# Patient Record
Sex: MALE | Race: WHITE | NOT HISPANIC OR LATINO | Employment: FULL TIME | URBAN - METROPOLITAN AREA
[De-identification: names, ages, dates, MRNs, and addresses within clinical notes are randomized per-mention and may not be internally consistent; named-entity substitution may affect disease eponyms.]

---
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
## 2018-07-30 ENCOUNTER — OFFICE VISIT (OUTPATIENT)
Dept: URGENT CARE | Age: 18
End: 2018-07-30
Payer: COMMERCIAL

## 2018-07-30 VITALS
SYSTOLIC BLOOD PRESSURE: 124 MMHG | WEIGHT: 182.2 LBS | DIASTOLIC BLOOD PRESSURE: 76 MMHG | TEMPERATURE: 98.1 F | RESPIRATION RATE: 16 BRPM | OXYGEN SATURATION: 98 % | HEART RATE: 92 BPM

## 2018-07-30 DIAGNOSIS — R30.0 DYSURIA: Primary | ICD-10-CM

## 2018-07-30 DIAGNOSIS — R30.0 BURNING WITH URINATION: Primary | ICD-10-CM

## 2018-07-30 LAB
SL AMB  POCT GLUCOSE, UA: NORMAL
SL AMB LEUKOCYTE ESTERASE,UA: NORMAL
SL AMB POCT BILIRUBIN,UA: NORMAL
SL AMB POCT BLOOD,UA: NORMAL
SL AMB POCT CLARITY,UA: CLEAR
SL AMB POCT COLOR,UA: YELLOW
SL AMB POCT KETONES,UA: NORMAL
SL AMB POCT NITRITE,UA: NORMAL
SL AMB POCT PH,UA: 5
SL AMB POCT SPECIFIC GRAVITY,UA: 1.01
SL AMB POCT URINE PROTEIN: NORMAL
SL AMB POCT UROBILINOGEN: 0.2

## 2018-07-30 PROCEDURE — 81002 URINALYSIS NONAUTO W/O SCOPE: CPT | Performed by: FAMILY MEDICINE

## 2018-07-30 PROCEDURE — 99203 OFFICE O/P NEW LOW 30 MIN: CPT | Performed by: FAMILY MEDICINE

## 2018-07-30 PROCEDURE — 87086 URINE CULTURE/COLONY COUNT: CPT | Performed by: FAMILY MEDICINE

## 2018-07-30 NOTE — PROGRESS NOTES
[facility]        NAME: Tina Cerna is a 19 y o  male  : 2000    MRN: [id number]  DATE: 2018  TIME: 6:50 PM    Assessment and Plan   Burning with urination [R30 0]  1  Burning with urination  POCT urine dip     UA dip unremarkable  Patient Instructions   Your urine dip was normal    We will send your urine for a culture  Please call us in 2-3 days for the results  Increase fluid intake to at least 68 oz water per day  If your urine culture is normal, and the pain continues, you will need further evaluation by a urologist  Madhavi Mccord can find a urologist through our [employer] number  [phone]  If symptoms worsen, or you notice any fevers, chills, vomiting or discharge, go to the ER immediately  Chief Complaint     Chief Complaint   Patient presents with    Back Pain     Pt has pain since this morning, burning when urinating         History of Present Illness       20 y/o male with c/o urinary burning and pain since last night  He denies penile discharge and lesions  He denies new sexual partners  He denies recent intercourse  He also c/o lower back pain on right side  He denies n/v/d  Denies constipation, fevers and chills  Of note, he only drinks 17 oz water per day  Review of Systems   Review of Systems   Constitutional: Negative  HENT: Negative  Respiratory: Negative  Gastrointestinal: Negative for nausea and vomiting  Genitourinary: Positive for dysuria  Negative for decreased urine volume, discharge, flank pain, genital sores, hematuria, penile pain, penile swelling and urgency  All other systems reviewed and are negative  Current Medications     No current outpatient prescriptions on file      Current Allergies     Allergies as of 2018    (No Known Allergies)            The following portions of the patient's history were reviewed and updated as appropriate: allergies, current medications, past family history, past medical history, past social history, past surgical history and problem list    No past medical history on file  No past surgical history on file  Objective   /76   Pulse 92   Temp 98 1 °F (36 7 °C)   Resp 16   Wt 82 6 kg (182 lb 3 2 oz)   SpO2 98%        Physical Exam     Physical Exam   Constitutional: He is oriented to person, place, and time  He appears well-developed and well-nourished  Cardiovascular: Normal rate, regular rhythm and normal heart sounds  Pulmonary/Chest: Effort normal and breath sounds normal    Abdominal: Soft  Normal appearance and bowel sounds are normal  There is no tenderness  There is no rigidity, no rebound, no guarding and no CVA tenderness  Neurological: He is alert and oriented to person, place, and time  Psychiatric: He has a normal mood and affect  His behavior is normal    Nursing note and vitals reviewed

## 2018-07-30 NOTE — PATIENT INSTRUCTIONS
Your urine dip was normal    We will send your urine for a culture  Please call us in 2-3 days for the results  Increase fluid intake to at least 68 oz water per day  If your urine culture is normal, and the pain continues, you will need further evaluation by a urologist  Michi Garvey can find a urologist through our [employer] number  [phone]  If symptoms worsen, or you notice any fevers, chills, vomiting or discharge, go to the ER immediately

## 2018-07-31 LAB — BACTERIA UR CULT: ABNORMAL

## 2018-08-02 ENCOUNTER — TELEPHONE (OUTPATIENT)
Dept: URGENT CARE | Facility: MEDICAL CENTER | Age: 18
End: 2018-08-02

## 2018-08-02 NOTE — TELEPHONE ENCOUNTER
Discussed culture results with patient  Referred to urology  ----- Message from Gely Soliman DO sent at 8/1/2018  4:32 PM EDT -----      ----- Message -----  From: Lab, Background User  Sent: 7/31/2018   7:28 PM  To:  Gely Soliman DO

## 2018-12-05 ENCOUNTER — HOSPITAL ENCOUNTER (OUTPATIENT)
Dept: SLEEP MEDICINE | Facility: HOSPITAL | Age: 18
Discharge: HOME | End: 2018-12-05
Attending: INTERNAL MEDICINE | Admitting: INTERNAL MEDICINE

## 2018-12-28 ENCOUNTER — HOSPITAL ENCOUNTER (OUTPATIENT)
Dept: SLEEP MEDICINE | Facility: HOSPITAL | Age: 18
Discharge: HOME | End: 2018-12-28
Attending: INTERNAL MEDICINE | Admitting: INTERNAL MEDICINE

## 2019-03-14 ENCOUNTER — HOSPITAL ENCOUNTER (OUTPATIENT)
Dept: SLEEP MEDICINE | Facility: HOSPITAL | Age: 19
Discharge: HOME | End: 2019-03-14
Attending: INTERNAL MEDICINE | Admitting: INTERNAL MEDICINE

## 2019-06-18 PROBLEM — G47.33 OSA (OBSTRUCTIVE SLEEP APNEA): Status: ACTIVE | Noted: 2019-06-18

## 2019-06-20 ENCOUNTER — OFFICE VISIT (OUTPATIENT)
Dept: SLEEP MEDICINE | Facility: HOSPITAL | Age: 19
End: 2019-06-20

## 2019-06-20 VITALS
DIASTOLIC BLOOD PRESSURE: 71 MMHG | OXYGEN SATURATION: 99 % | WEIGHT: 240.1 LBS | BODY MASS INDEX: 33.61 KG/M2 | SYSTOLIC BLOOD PRESSURE: 116 MMHG | HEIGHT: 71 IN | HEART RATE: 72 BPM

## 2019-06-20 DIAGNOSIS — G47.33 OSA (OBSTRUCTIVE SLEEP APNEA): Primary | ICD-10-CM

## 2019-06-20 PROCEDURE — G0463 HOSPITAL OUTPT CLINIC VISIT: HCPCS

## 2019-06-20 RX ORDER — LISINOPRIL 20 MG/1
20 TABLET ORAL DAILY
COMMUNITY
End: 2019-06-20

## 2019-06-20 NOTE — PROGRESS NOTES
SLEEP MEDICINE CONSULT      Patient Identification:     Name: Sj Lester   Age: 19 y.o.   Gender: male   : 2000   MRN: [id number]     Date of consult: 2019    PCP/Referring Physician(s):     PCP: Stacy Haney MD  Referring Provider: Paola Sharma MD      Chief Complaint:   Follow-up for compliance after using the mask with given pressures for 3 months or so.    History of Present Illness:   This is a very pleasant 19 years old  gentleman who has been diagnosed with obstructive sleep apnea.  He was started on a CPAP mode of therapy.  He is here today for 3 months follow-up for compliance.    Memory card has been downloaded.    He uses a nasal mask which is where nasal mask.  He has not noticed any air leaks around it.  The pressures are quite tolerable to him.  He uses humidifier on a regular basis.    He has slept much better with the given pressures in the mask.  He has often woken up with a nasal congestion in the morning.  He denies any postnasal drainage at night or in the morning.  Occasionally has dry mouth in the morning.  Denies symptoms of heartburn or headache at night or in the morning.  He does not snore with the mask in place.    He usually wakes up in the morning awake alert and rested.  He likes to drink 2 or 3 cups of coffee throughout the day.  He gets tired in the afternoon.  He does not take any naps.  He has sometimes dozed off after dinner.  He has started school this summer.  He is now working part-time with his school.    Allergies, Medications, and Past History:   Allergies:    No Known Allergies  Current Medications:    Prior to Admission medications    Medication Sig Start Date End Date Taking? Authorizing Provider   lisinopril (PRINIVIL,ZESTRIL) 20 MG tablet Take 20 mg by mouth Daily.  19 Yes Provider, MD Dona     Past Medical History:    Past Medical History:   Diagnosis Date   • Allergic rhinitis    • Chronic sinusitis    • Obesity   "  • S/P correction of deviated nasal septum       Past Surgical History:    Past Surgical History:   Procedure Laterality Date   • NASAL SEPTUM SURGERY     • SINUS SURGERY        Social History:    Social History     Tobacco Use   • Smoking status: Former Smoker   • Smokeless tobacco: Never Used   Substance Use Topics   • Alcohol use: Yes     Frequency: Monthly or less   • Drug use: No     Family Medical History:  Family History   Problem Relation Age of Onset   • Sleep apnea Father          Review of Systems:   Constitutional:  Denies fever or chills and weight gain.  Trying to lose weight.  Has successfully lost 10 pounds.  Eyes:  Denies change in visual acuity   HENT:  has nasal congestion, no sore throat or post nasal drainage he underwent allergy testing.  He was found to be severely allergic to pollens.  He takes Zyrtec over-the-counter for a while for a month or so.  He was also prescribed steroid nasal spray.  He felt anxious with nasal spray and antihistamine together.  Allergy shots were offered.  He declined it.  He  has started doing nasal rinses in the morning with distilled water.  Respiratory:  Denies cough, shortness of breath .  He has experienced dyspnea on exertion    Cardiovascular:  Denies chest pain or edema   GI:  Denies abdominal pain, nausea, vomiting, bloody stools or diarrhea   :  Denies dysuria or nocturia   Musculoskeletal:  Denies back pain or joint pain   Integument:  Denies rash   Neurologic:  Denies headache, focal weakness or sensory changes. No history of stroke or seizures.   Endocrine:  Denies polyuria or polydipsia   Lymphatic:  Denies swollen glands   Psychiatric:  Denies depression, anxiety or claustrophobia      Physical Exam:     Vitals:    06/20/19 1423   BP: 116/71   Pulse: 72   SpO2: 99%   Weight: 109 kg (240 lb 1.6 oz)   Height: 179.8 cm (70.8\")     HEENT: Head is normocephalic, atraumatic, normal distribution of hair. Pupils reactive to light. Ocular movements intact. " Moderate  nasal congestion on sniff test. No deviated nasal septum. No micrognathia.  Throat: Mallapatti stage 4, tongue midline, mucosa moist.  Neck: no JVD, thyromegaly, mass, +midline trachea, Neck circumference  20 inches  Lungs: clear, no wheeze, rhonchi, crackles  Cardiovascular: S1, S2, RRR no murmurs, rubs or gallops  Abd: +BS's soft NT/ND, no CVA tenderness.    Ext: no edema, cyanosis, clubbing, pulses intact  Neuro: Awake alert, oriented to time place and person. Grossly intact to motor, sensory cerebral, and cerebellar (without focal deficit)  Psych: No overt ren, depression, psychosis or inappropriate behavior  Skin: No rash, cellulitis, or ulcerations.  No facial rash or erosions    DIAGNOSTIC DATA  Memory card download shows data from 3/14/2019 to 6/19/2019 over 90 days of data reviewed 1 day without use percentage of use 99%.  Max hour she was 9 hours 1 minute.  Minimum time use 1 hour 37 minutes.  92.9% of the time the mask has been used for more than 4 hours.  The average apnea hypotony index is 6.2 events per hour at 9 cm of CPAP pressure.  Assessment/Plan:   Obstructive sleep apnea:  This is a very pleasant 19 years old  gentleman who has been diagnosed with sleep apnea.  He is here today for 3 months follow-up for compliance.  He is compliant with therapy.  He is getting benefit from it.    The results of memory card download were discussed in detail with the patient all questions were answered.  Recommendation.  He is advised to continue using his mask with given pressures for nightly basis.  Is advised to use the mask with given pressures for at least 4 hours of minimal benefit or as long as he sleeps on maximum benefit.  Is advised to use the mask with given pressures if he takes a nap during the daytime.  The CPAP pressure has been increased to 11 CWP.    Allergic rhinitis:  He has remained symptomatic with upper airway congestion which has interfered with adequate therapy for  sleep apnea.  He underwent allergy testing which showed severe reaction to environmental antigens.  He has declined immunization.  He has difficulty using a steroid nasal spray and entire histamine together.  He gets anxious with it.  Recommendation.  Continue antihistamine.  He may benefit from Singulair.  He is strongly advised to start immunization regimen.  Obesity; based on BMI of 33.7.  He is trying to lose weight.  He has restricted his calories intake.  He has successfully lost 10 pounds in last 3 months.  Recommendation.  He is advised to continue his weight loss regimen.  Driving instructions. Patient is advised to avoid driving if tired or somnolent. To avoid all alcoholic beverages or medications causing somnolence.         Diagnosis Plan   1. MEGAN (obstructive sleep apnea)       No orders of the defined types were placed in this encounter.    No orders of the defined types were placed in this encounter.        Paola Sharma MD  6/20/2019  3:50 PM

## 2019-12-30 ENCOUNTER — OFFICE VISIT (OUTPATIENT)
Dept: SLEEP MEDICINE | Facility: HOSPITAL | Age: 19
End: 2019-12-30

## 2019-12-30 VITALS
SYSTOLIC BLOOD PRESSURE: 141 MMHG | HEIGHT: 69 IN | DIASTOLIC BLOOD PRESSURE: 84 MMHG | WEIGHT: 241.6 LBS | OXYGEN SATURATION: 99 % | HEART RATE: 108 BPM | BODY MASS INDEX: 35.78 KG/M2

## 2019-12-30 DIAGNOSIS — G47.33 OBSTRUCTIVE SLEEP APNEA, ADULT: Primary | ICD-10-CM

## 2019-12-30 PROCEDURE — G0463 HOSPITAL OUTPT CLINIC VISIT: HCPCS

## 2019-12-30 NOTE — PROGRESS NOTES
SLEEP MEDICINE CONSULT      Patient Identification:     Name: Sj Lester   Age: 19 y.o.   Gender: male   : 2000   MRN: [id number]     Date of consult: 2019    PCP/Referring Physician(s):     PCP: Stacy Haney MD  Referring Provider: Paola Sharma MD      Chief Complaint:   Obstructive sleep apnea.  6 months follow-up for compliance.      History of Present Illness:   This is a very pleasant 19 years old  gentleman who has been diagnosed with obstructive sleep apnea.  He was started on a CPAP mode of therapy.  He is here today for 6months follow-up for compliance.    Memory card has been downloaded.    He uses a DreamWear nasal mask .  No air leaks noted by him.  The pressures are quite tolerable to him.  He uses humidifier on a regular basis.    He has slept much better with the given pressures in the mask.  He has often woken up with a nasal congestion in the morning.  He denies any postnasal drainage at night or in the morning.  Occasionally has dry mouth in the morning.  Denies symptoms of heartburn or headache at night or in the morning.  He does not snore with the mask in place.    He usually wakes up in the morning awake alert and rested.  He likes to drink 2 or 3 cups of coffee throughout the day.  He gets tired in the afternoon.  He does not take any naps.  He has sometimes dozed off after dinner.  He has started school this summer.  He is now working part-time with his school.    Allergies, Medications, and Past History:   Allergies:    No Known Allergies  Current Medications:    Prior to Admission medications    Medication Sig Start Date End Date Taking? Authorizing Provider   lisinopril (PRINIVIL,ZESTRIL) 20 MG tablet Take 20 mg by mouth Daily.  19 Yes Provider, MD Dona     Past Medical History:    Past Medical History:   Diagnosis Date   • Allergic rhinitis    • Chronic sinusitis    • Obesity    • S/P correction of deviated nasal septum       Past  "Surgical History:    Past Surgical History:   Procedure Laterality Date   • NASAL SEPTUM SURGERY     • SINUS SURGERY        Social History:    Social History     Tobacco Use   • Smoking status: Former Smoker   • Smokeless tobacco: Never Used   Substance Use Topics   • Alcohol use: Yes     Frequency: Monthly or less   • Drug use: No     Family Medical History:  Family History   Problem Relation Age of Onset   • Sleep apnea Father          Review of Systems:   Constitutional:  Denies fever or chills and weight gain.  Trying to lose weight.  Has successfully lost 14 pounds in 6 months..  Eyes:  Denies change in visual acuity   HENT:  has nasal congestion, no sore throat or post nasal drainage he underwent allergy testing.  He was found to be severely allergic to pollens.  He takes Zyrtec over-the-counter for a while for a month or so.  He was also prescribed steroid nasal spray.  He felt anxious with nasal spray and antihistamine together.  Allergy shots were offered.  He declined it.  He  has started doing nasal rinses in the morning with distilled water.  Respiratory:  Denies cough, shortness of breath .  He has experienced dyspnea on exertion    Cardiovascular:  Denies chest pain or edema   GI:  Denies abdominal pain, nausea, vomiting, bloody stools or diarrhea   :  Denies dysuria or nocturia   Musculoskeletal:  Denies back pain or joint pain   Integument:  Denies rash   Neurologic:  Denies headache, focal weakness or sensory changes. No history of stroke or seizures.   Endocrine:  Denies polyuria or polydipsia   Lymphatic:  Denies swollen glands   Psychiatric:  Denies depression, anxiety or claustrophobia      Physical Exam:     Vitals:    12/30/19 1607   BP: 141/84   Pulse: 108   SpO2: 99%   Weight: 110 kg (241 lb 9.6 oz)   Height: 175.3 cm (69\")     HEENT: Head is normocephalic, atraumatic, normal distribution of hair. Pupils reactive to light. Ocular movements intact. Moderate  nasal congestion on sniff test. " No deviated nasal septum. No micrognathia.  Throat: Mallapatti stage 4, tongue midline, mucosa moist.  Neck: no JVD, thyromegaly, mass, +midline trachea, Neck circumference  20 inches  Lungs: clear, no wheeze, rhonchi, crackles  Cardiovascular: S1, S2, RRR no murmurs, rubs or gallops  Abd: +BS's soft NT/ND, no CVA tenderness.    Ext: no edema, cyanosis, clubbing, pulses intact  Neuro: Awake alert, oriented to time place and person. Grossly intact to motor, sensory cerebral, and cerebellar (without focal deficit)  Psych: No overt ren, depression, psychosis or inappropriate behavior  Skin: No rash, cellulitis, or ulcerations.  No facial rash or erosions    DIAGNOSTIC DATA  Memory card download shows data from 6/19/2019 to 12/29/2019 [id number] days of data reviewed 8 day without use percentage of use 95.9%.  Max hour she was 9 hours 39 minute.  Minimum time use 6 hour 1 minutes.  91.8% of the time the mask has been used for more than 4 hours.  The average apnea hypotony index is 5.1events per hour at 11 cm of CPAP pressure.  Assessment/Plan:   Obstructive sleep apnea:  This is a very pleasant 19 years old  gentleman who has been diagnosed with sleep apnea.  He is here today for 6 months follow-up for compliance.  He is compliant with therapy.  He is getting benefit from it.    The results of memory card download were discussed in detail with the patient all questions were answered.  Recommendation.  He is advised to continue using his mask with given pressures for nightly basis.  Is advised to use the mask with given pressures for at least 4 hours of minimal benefit or as long as he sleeps on maximum benefit.  Is advised to use the mask with given pressures if he takes a nap during the daytime.      Allergic rhinitis:  He has remained symptomatic with upper airway congestion which has interfered with adequate therapy for sleep apnea.  He underwent allergy testing which showed severe reaction to environmental  antigens.  He has declined immunization.  He has difficulty using a steroid nasal spray and entire histamine together.  He gets anxious with it.  Recommendation.  Continue antihistamine.  He may benefit from Singulair.  He is strongly advised to start immunization regimen.  Obesity; based on BMI of 35.7.  He is trying to lose weight.  He has restricted his calories intake.  He has successfully lost 10 pounds in last 3 months.  Recommendation.  He is advised to continue his weight loss regimen.  Driving instructions. Patient is advised to avoid driving if tired or somnolent. To avoid all alcoholic beverages or medications causing somnolence.         Diagnosis Plan   1. Obstructive sleep apnea, adult  CPAP Therapy     No orders of the defined types were placed in this encounter.    Orders Placed This Encounter   Procedures   • CPAP Therapy     Order Specific Question:   Equipment     Answer:    CPAP     Order Specific Question:   PAP Machine Brand     Answer:   Respironics     Order Specific Question:   PAP Tubing (1 per 3 months)     Answer:    6' Standard tubing     Order Specific Question:   PAP Mask (1 per 3 months)     Answer:    Nasal mask     Order Specific Question:   Nasal cushion or pillow (2 per month)     Answer:    Cushion replacement for nasal type     Order Specific Question:   PAP Accessories     Answer:    Water Chamber for PAP Humidifier (1 per 6 month) &  Filter PAP Disposable (2 per month) &  Filter PAP Non-Disposable (1 per 6 months) &  Chinstrap to be used with PAP (1 per 6 months) &  Headgear to be used with PAP (1 per 6 mo)     Order Specific Question:   Does the patient have Obstructive Sleep Apnea or other qualifying diagnosis for PAP ordered?     Answer:   Yes     Order Specific Question:   Does the patient require humidification?     Answer:   Yes     Order Specific Question:   Humidifier     Answer:    Humidifier Heated for CPAP or BiPAP      Order Specific Question:   If ordering a BiPAP for MEGAN a CPAP has been tried and/or ruled out?     Answer:   Does not apply     Order Specific Question:   The patient requires a PAP Device & continued use of Supplies to administer effective PAP therapy at the frequency of use ordered above     Answer:   Yes     Order Specific Question:   Initial Supplies and refills authorized for 12 months?     Answer:   Yes     Order Specific Question:   The face to face evaluation was performed on     Answer:   12/30/2019     Order Specific Question:   Which [employer] company is this being sent to?     Answer:   [employer] [facility] [[id number]]     Order Specific Question:   Length of Need (99 Months = Lifetime)     Answer:   99 Months = Lifetime         Paola Sharma MD  12/30/2019  5:05 PM

## 2021-03-02 ENCOUNTER — OFFICE VISIT (OUTPATIENT)
Dept: SLEEP MEDICINE | Facility: CLINIC | Age: 21
End: 2021-03-02

## 2021-03-02 VITALS
WEIGHT: 250 LBS | OXYGEN SATURATION: 98 % | DIASTOLIC BLOOD PRESSURE: 76 MMHG | BODY MASS INDEX: 37.03 KG/M2 | SYSTOLIC BLOOD PRESSURE: 117 MMHG | HEART RATE: 82 BPM | HEIGHT: 69 IN

## 2021-03-02 DIAGNOSIS — E66.9 CLASS 2 OBESITY: ICD-10-CM

## 2021-03-02 DIAGNOSIS — G47.33 OSA ON CPAP: Primary | ICD-10-CM

## 2021-03-02 DIAGNOSIS — Z99.89 OSA ON CPAP: Primary | ICD-10-CM

## 2021-03-02 PROBLEM — E66.812 CLASS 2 OBESITY: Status: ACTIVE | Noted: 2021-03-02

## 2021-03-02 PROCEDURE — G0463 HOSPITAL OUTPT CLINIC VISIT: HCPCS

## 2021-03-02 PROCEDURE — 99213 OFFICE O/P EST LOW 20 MIN: CPT | Performed by: INTERNAL MEDICINE

## 2021-03-02 RX ORDER — CETIRIZINE HYDROCHLORIDE 5 MG/1
5 TABLET ORAL DAILY
COMMUNITY

## 2021-03-02 NOTE — PROGRESS NOTES
[facility]  [address] [place] [address]  Phone   Fax       SLEEP CLINIC FOLLOW UP PROGRESS NOTE.    Sj Lester  2000  20 y.o.  male      PCP: Stacy Haney MD      Date of visit: 3/2/2021    Chief Complaint   Patient presents with   • Sleep Apnea   • Follow-up       INTERM HISTORY:  This is a 20 y.o. years old patient who has a history of sleep apnea and is on CPAP.   The patient was previously seen on December 30, 2019, the note was reviewed.  Was a patient of Dr Paola Sharma MD  The diagnostic study done, split-night study on December 28, 2018 which showed severe sleep apnea with AHI of 35/h, was also reviewed with the patient.  Patient states that he feels much better and has more energy when he wakes up.  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    Sleep schedule  Normally goes to bed at 1 AM  Wakes up at 7:15 AM  Number of times you wake up once asleep: None  Do you feel refreshed after waking up ?  Yes    The Smart card downloaded on 3/2/2021 has been reviewed by me for compliance and discussed the data with the patient  Compliance; 93%  > 4 hr use, 91%  Average use of the device 6 hours and 24 minutes per night  Residual AHI: 2.9 /hr (normal less than 5)  Mask type: Nasal, but he would like to try nasal pillows DreamWear  DME: [facility]      Past Medical History:   Diagnosis Date   • Allergic rhinitis    • Chronic sinusitis    • Obesity    • MEGAN on CPAP     AHI 35/h   • S/P correction of deviated nasal septum        MEDICATIONS: reviewed by me    Current Outpatient Medications:   •  cetirizine (zyrTEC) 5 MG tablet, Take 5 mg by mouth Daily., Disp: , Rfl:     No Known Allergies reviewed by me    SOCIAL, FAMILY HISTORY: Medical records are reviewed and noted by me.  History of smoking no  History of alcohol use less than 2 a week  Caffeine use 3    REVIEW OF SYSTEMS:   [place] Sleepiness  "Scale :Total score: 3   Snoring: Resolved  Morning headache: No  Nasal congestion: Yes  Leg movements: No  Heart burn no    PHYSICAL EXAMINATION:  Vitals:    03/02/21 1006   BP: 117/76   BP Location: Right arm   Patient Position: Sitting   Cuff Size: Adult   Pulse: 82   SpO2: 98%   Weight: 113 kg (250 lb)   Height: 175.3 cm (69\")    Body mass index is 36.92 kg/m².    HEENT: pupils are round equal and reacting to light and accommodation, nasal passage is clear, no nasal polyps, no lymphadenopathy, throat is clear, oral airway Mallampati class 3  RESPRATORY SYSTEM: Breath sounds are equal on both sides and are normal, no wheezes or crackles  CARDIOVASULAR SYSTEM: Heart rate is regular without murmur  ABDOMEN: Soft, no ascites, no hepatosplenomegaly.  EXTREMITIES: No cyanosis, clubbing or edema       ASSESSMENT AND PLAN:  · Obstructive sleep apnea, patient is using the device with good compliance for treatment of sleep apnea.  I have reviewed the smart card down load and discussed with the patient the download data and encouarged the patient to continue to use the device.  The symptoms have improved and the residual AHI is acceptable.  The device is benefiting the patient and the device is medically necessary. The patient is also instructed to get the supplies from the [facility] and a prescription for supplies has been sent to the [facility].  I have also discussed the good sleep hygiene habits and adequate amount of sleep needed.  · Obesity, patient's BMI is Body mass index is 36.92 kg/m²..  I have discussed weight reduction and the health benefits.  I have also discuss the relationship between the weight and sleep apnea and encouraged the patient to lose weight which is beneficial in treating sleep apnea. Discussed diet and exercise with the patient.  · Nasal congestion.  I have talked to the patient about adjusting the humidification.  · Return to clinic in 12 months for follow-up and patient also instructed to " call the sleep clinic for any problems.  All the patient's questions were answered.        Angela Pretty MD  Sleep Medicine  Medical Director for [facility]  3/2/2021

## 2021-07-21 ENCOUNTER — TELEPHONE (OUTPATIENT)
Dept: SLEEP MEDICINE | Facility: CLINIC | Age: 21
End: 2021-07-21

## 2022-03-01 ENCOUNTER — OFFICE VISIT (OUTPATIENT)
Dept: SLEEP MEDICINE | Facility: CLINIC | Age: 22
End: 2022-03-01

## 2022-03-01 VITALS
BODY MASS INDEX: 37.03 KG/M2 | SYSTOLIC BLOOD PRESSURE: 127 MMHG | DIASTOLIC BLOOD PRESSURE: 77 MMHG | HEIGHT: 69 IN | HEART RATE: 83 BPM | OXYGEN SATURATION: 98 % | WEIGHT: 250 LBS

## 2022-03-01 DIAGNOSIS — G47.33 OSA ON CPAP: Primary | ICD-10-CM

## 2022-03-01 DIAGNOSIS — E66.9 CLASS 2 OBESITY: ICD-10-CM

## 2022-03-01 DIAGNOSIS — Z99.89 OSA ON CPAP: Primary | ICD-10-CM

## 2022-03-01 PROCEDURE — G0463 HOSPITAL OUTPT CLINIC VISIT: HCPCS

## 2022-03-01 PROCEDURE — 99213 OFFICE O/P EST LOW 20 MIN: CPT | Performed by: INTERNAL MEDICINE

## 2022-03-01 NOTE — PROGRESS NOTES
"  [address] [address]  Phone   Fax       SLEEP CLINIC FOLLOW UP PROGRESS NOTE.    Sj Lester  2000  21 y.o.  male      PCP: Stacy Haney MD      Date of visit: 3/1/2022    Chief Complaint   Patient presents with   • Sleep Apnea   • Obesity       HPI:  This is a 21 y.o. years old patient is here for the management of obstructive sleep apnea.  Sleep apnea is severe in severity with a AHI of 35/hr. Patient is using positive airway pressure therapy with CPAP 9 cm and the symptoms of snoring, non-restorative sleep and daytime excessive sleepiness have improved significantly on the therapy. Normally goes to bed at 12 midnight and wakes up at 7:45 AM.  The patient wakes up 1 time(s) during the night and has no problem going back to sleep.  Feels refreshed after waking up.  Patient also denies headaches and nasal congestion.  He works as a banker    Medications and allergies are reviewed by me and documented in the encounter.     SOCIAL (habits pertaining to sleep medicine)  History tobacco use:No   History of alcohol use: 0 per week  Caffeine use: 2     REVIEW OF SYSTEMS:   [place] Sleepiness Scale :Total score: 4   Nasal congestion:No   Dry mouth/nose:No   Post nasal drip; No   Acid reflux/Heartburn:No   Abd bloating:No   Morning headache:No   Anxiety:No   Depression:No    PHYSICAL EXAMINATION:  CONSTITUTIONAL:  Vitals:    03/01/22 1604   BP: 127/77   BP Location: Left arm   Patient Position: Sitting   Cuff Size: Adult   Pulse: 83   SpO2: 98%   Weight: 113 kg (250 lb)   Height: 175.3 cm (69\")    Body mass index is 36.92 kg/m².   NOSE: nasal passages are clear, No deformities noted   RESP SYSTEM: Not in any respiratory distress, no chest deformities noted,   CARDIOVASULAR: No edema noted  NEURO: Oriented x 3, gait normal,  Mood and affect appeared appropriate      Data reviewed:  The Smart card downloaded on 3/1/2022 has been " reviewed independently by me for compliance and discussed the data with the patient.   Compliance; 100%  More than 4 hr use, 100%  Average use of the device 6 hours and 40 g per night  Residual AHI: 3.3 /hr (goal < 5.0 /hr)  Mask type: Nasal mask  Device: DreamStation 2  DME: [employer]      ASSESSMENT AND PLAN:  · Obstructive sleep apnea ( G 47.33).  The symptoms of sleep apnea have improved with the device and the treatment.  Patient's compliance with the device is excellent for treatment of sleep apnea.  I have independently reviewed the smart card down load and discussed with the patient the download data and encouarged the patient to continue to use the device.The residual AHI is acceptable. The device is benefiting the patient and the device is medically necessary.  Without proper control of sleep apnea and good compliance there is a increased risk for hypertension, diabetes mellitus and nonrestorative sleep with hypersomnia which can increase risk for motor vehicle accidents.  Untreated sleep apnea is also a risk factor for development of atrial fibrillation, pulmonary hypertension and stroke. The patient is also instructed to get the supplies from the [facility] and and change them on a regular basis.  A prescription for supplies has been sent to the [facility].  I have also discussed the good sleep hygiene habits and adequate amount of sleep needed for good health.  · Obesity  2 with BMI is Body mass index is 36.92 kg/m².. I have discuss the relationship between the weight and sleep apnea. The benefit of weight loss in reducing severity of sleep apnea was discussed. Discussed diet and exercise with the patient to achieve ideal BMI.   · Return in about 1 year (around 3/1/2023) for Annual visit with smartcard download. . Patient's questions were answered.      Angela Pretty MD  Sleep Medicine.  Medical Director, [facility]  3/1/2022 ,

## 2022-04-12 ENCOUNTER — OFFICE (AMBULATORY)
Dept: URBAN - METROPOLITAN AREA CLINIC 64 | Facility: CLINIC | Age: 22
End: 2022-04-12

## 2022-04-12 VITALS
HEIGHT: 69 IN | HEART RATE: 83 BPM | SYSTOLIC BLOOD PRESSURE: 121 MMHG | DIASTOLIC BLOOD PRESSURE: 79 MMHG | WEIGHT: 257 LBS

## 2022-04-12 DIAGNOSIS — R10.84 GENERALIZED ABDOMINAL PAIN: ICD-10-CM

## 2022-04-12 DIAGNOSIS — R11.0 NAUSEA: ICD-10-CM

## 2022-04-12 DIAGNOSIS — R19.7 DIARRHEA, UNSPECIFIED: ICD-10-CM

## 2022-04-12 PROCEDURE — 99204 OFFICE O/P NEW MOD 45 MIN: CPT | Performed by: INTERNAL MEDICINE

## 2022-04-12 RX ORDER — ONDANSETRON HYDROCHLORIDE 4 MG/1
TABLET, FILM COATED ORAL
Qty: 45 | Refills: 0 | Status: ACTIVE
Start: 2022-04-12

## 2022-04-12 RX ORDER — DICYCLOMINE HYDROCHLORIDE 20 MG/1
TABLET ORAL
Qty: 90 | Refills: 0 | Status: ACTIVE

## 2022-05-16 ENCOUNTER — OFFICE (AMBULATORY)
Dept: URBAN - METROPOLITAN AREA PATHOLOGY 4 | Facility: PATHOLOGY | Age: 22
End: 2022-05-16

## 2022-05-16 ENCOUNTER — ON CAMPUS - OUTPATIENT (AMBULATORY)
Dept: URBAN - METROPOLITAN AREA HOSPITAL 2 | Facility: HOSPITAL | Age: 22
End: 2022-05-16

## 2022-05-16 VITALS
DIASTOLIC BLOOD PRESSURE: 66 MMHG | HEART RATE: 98 BPM | HEART RATE: 91 BPM | HEART RATE: 85 BPM | DIASTOLIC BLOOD PRESSURE: 59 MMHG | HEART RATE: 97 BPM | SYSTOLIC BLOOD PRESSURE: 143 MMHG | HEART RATE: 93 BPM | OXYGEN SATURATION: 94 % | DIASTOLIC BLOOD PRESSURE: 71 MMHG | HEART RATE: 80 BPM | OXYGEN SATURATION: 98 % | OXYGEN SATURATION: 96 % | HEART RATE: 96 BPM | SYSTOLIC BLOOD PRESSURE: 121 MMHG | OXYGEN SATURATION: 100 % | DIASTOLIC BLOOD PRESSURE: 93 MMHG | TEMPERATURE: 98.2 F | SYSTOLIC BLOOD PRESSURE: 90 MMHG | WEIGHT: 249 LBS | HEART RATE: 87 BPM | OXYGEN SATURATION: 99 % | SYSTOLIC BLOOD PRESSURE: 110 MMHG | RESPIRATION RATE: 97 BRPM | DIASTOLIC BLOOD PRESSURE: 63 MMHG | DIASTOLIC BLOOD PRESSURE: 69 MMHG | DIASTOLIC BLOOD PRESSURE: 68 MMHG | HEIGHT: 69 IN | SYSTOLIC BLOOD PRESSURE: 133 MMHG | SYSTOLIC BLOOD PRESSURE: 119 MMHG | SYSTOLIC BLOOD PRESSURE: 136 MMHG | RESPIRATION RATE: 16 BRPM

## 2022-05-16 DIAGNOSIS — K31.89 OTHER DISEASES OF STOMACH AND DUODENUM: ICD-10-CM

## 2022-05-16 DIAGNOSIS — K20.80 OTHER ESOPHAGITIS WITHOUT BLEEDING: ICD-10-CM

## 2022-05-16 DIAGNOSIS — R11.2 NAUSEA WITH VOMITING, UNSPECIFIED: ICD-10-CM

## 2022-05-16 DIAGNOSIS — K44.9 DIAPHRAGMATIC HERNIA WITHOUT OBSTRUCTION OR GANGRENE: ICD-10-CM

## 2022-05-16 DIAGNOSIS — R10.84 GENERALIZED ABDOMINAL PAIN: ICD-10-CM

## 2022-05-16 DIAGNOSIS — R19.4 CHANGE IN BOWEL HABIT: ICD-10-CM

## 2022-05-16 DIAGNOSIS — R19.7 DIARRHEA, UNSPECIFIED: ICD-10-CM

## 2022-05-16 LAB
GI HISTOLOGY: A. UNSPECIFIED: (no result)
GI HISTOLOGY: B. SELECT: (no result)
GI HISTOLOGY: C. UNSPECIFIED: (no result)
GI HISTOLOGY: PDF REPORT: (no result)

## 2022-05-16 PROCEDURE — 88305 TISSUE EXAM BY PATHOLOGIST: CPT | Performed by: INTERNAL MEDICINE

## 2022-05-16 PROCEDURE — 45380 COLONOSCOPY AND BIOPSY: CPT | Performed by: INTERNAL MEDICINE

## 2022-05-16 PROCEDURE — 43239 EGD BIOPSY SINGLE/MULTIPLE: CPT | Performed by: INTERNAL MEDICINE

## 2022-05-16 RX ORDER — PANTOPRAZOLE SODIUM 40 MG/1
80 TABLET, DELAYED RELEASE ORAL
Qty: 180 | Refills: 3 | Status: COMPLETED
Start: 2022-05-16 | End: 2023-10-06 | Stop reason: SDUPTHER

## 2022-05-18 PROBLEM — K31.89 OTHER DISEASES OF STOMACH AND DUODENUM: Status: ACTIVE | Noted: 2022-05-16

## 2022-05-18 PROBLEM — K44.9 DIAPHRAGMATIC HERNIA WITHOUT OBSTRUCTION OR GANGRENE: Status: ACTIVE | Noted: 2022-05-16

## 2022-05-18 PROBLEM — K20.80 OTHER ESOPHAGITIS WITHOUT BLEEDING: Status: ACTIVE | Noted: 2022-05-16

## 2022-07-28 ENCOUNTER — TELEPHONE (OUTPATIENT)
Dept: FAMILY MEDICINE CLINIC | Facility: CLINIC | Age: 22
End: 2022-07-28

## 2022-07-28 NOTE — TELEPHONE ENCOUNTER
Caller: BRISEIDA MELÉNDEZ    Relationship to patient: Mother    Best call back number: [phone]      Date of exposure: UNKOWN    Date of positive COVID19 test: 07/28/22        COVID19 symptoms: FEVER, SHAKY AND NOT FEELING WELL AND PULSE IS ELEVATED.        Additional information or concerns: THEY ARE WANTING TO KNOW WHAT THEY NEED TO DO FOR HIM, THEY DID HOME TEST AND CAME POSITIVE AND THEY DO NOT KNOW WHAT PROTOCOLS ARE AND IF HE NEEDS TO GET THE PAXLOVID.     What is the patients preferred pharmacy:   [id number], [place] - [address] AT [facility] [phone] [facility] [phone]

## 2022-07-28 NOTE — TELEPHONE ENCOUNTER
Patient parent/guardian notified via voicemail . Advised to call back if she/he has any questions or concerns on 7/28 @ 2:12PM .

## 2022-07-28 NOTE — TELEPHONE ENCOUNTER
Patient last seen in 2017 so technically he is a new patient.  I do not know if he has a new PCP need to follow-up with them otherwise go to urgent care. Thanks

## 2022-09-06 ENCOUNTER — OFFICE (AMBULATORY)
Dept: URBAN - METROPOLITAN AREA PATHOLOGY 4 | Facility: PATHOLOGY | Age: 22
End: 2022-09-06
Payer: COMMERCIAL

## 2022-09-06 ENCOUNTER — ON CAMPUS - OUTPATIENT (AMBULATORY)
Dept: URBAN - METROPOLITAN AREA HOSPITAL 2 | Facility: HOSPITAL | Age: 22
End: 2022-09-06

## 2022-09-06 VITALS
TEMPERATURE: 98.6 F | HEART RATE: 87 BPM | SYSTOLIC BLOOD PRESSURE: 103 MMHG | RESPIRATION RATE: 19 BRPM | SYSTOLIC BLOOD PRESSURE: 102 MMHG | HEART RATE: 79 BPM | OXYGEN SATURATION: 95 % | RESPIRATION RATE: 18 BRPM | SYSTOLIC BLOOD PRESSURE: 105 MMHG | HEIGHT: 69 IN | DIASTOLIC BLOOD PRESSURE: 72 MMHG | HEART RATE: 93 BPM | OXYGEN SATURATION: 97 % | SYSTOLIC BLOOD PRESSURE: 129 MMHG | DIASTOLIC BLOOD PRESSURE: 84 MMHG | WEIGHT: 253 LBS | HEART RATE: 102 BPM | HEART RATE: 96 BPM | DIASTOLIC BLOOD PRESSURE: 55 MMHG | OXYGEN SATURATION: 99 % | DIASTOLIC BLOOD PRESSURE: 50 MMHG | OXYGEN SATURATION: 96 % | SYSTOLIC BLOOD PRESSURE: 133 MMHG | DIASTOLIC BLOOD PRESSURE: 68 MMHG | RESPIRATION RATE: 16 BRPM

## 2022-09-06 DIAGNOSIS — K44.9 DIAPHRAGMATIC HERNIA WITHOUT OBSTRUCTION OR GANGRENE: ICD-10-CM

## 2022-09-06 DIAGNOSIS — K21.00 GASTRO-ESOPHAGEAL REFLUX DISEASE WITH ESOPHAGITIS, WITHOUT B: ICD-10-CM

## 2022-09-06 DIAGNOSIS — K22.89 OTHER SPECIFIED DISEASE OF ESOPHAGUS: ICD-10-CM

## 2022-09-06 LAB
GI HISTOLOGY: A. SELECT: (no result)
GI HISTOLOGY: PDF REPORT: (no result)

## 2022-09-06 PROCEDURE — 43239 EGD BIOPSY SINGLE/MULTIPLE: CPT | Performed by: INTERNAL MEDICINE

## 2022-09-06 PROCEDURE — 88305 TISSUE EXAM BY PATHOLOGIST: CPT | Performed by: INTERNAL MEDICINE

## 2022-09-06 RX ORDER — PANTOPRAZOLE SODIUM 40 MG/1
80 TABLET, DELAYED RELEASE ORAL
Qty: 180 | Refills: 3 | Status: COMPLETED
Start: 2022-05-16 | End: 2023-10-06 | Stop reason: SDUPTHER

## 2023-11-12 ENCOUNTER — HOSPITAL ENCOUNTER (EMERGENCY)
Facility: HOSPITAL | Age: 23
Discharge: HOME | End: 2023-11-12
Attending: PEDIATRICS | Admitting: PEDIATRICS
Payer: COMMERCIAL

## 2023-11-12 VITALS
BODY MASS INDEX: 35.25 KG/M2 | RESPIRATION RATE: 18 BRPM | HEIGHT: 69 IN | WEIGHT: 238 LBS | DIASTOLIC BLOOD PRESSURE: 69 MMHG | OXYGEN SATURATION: 99 % | SYSTOLIC BLOOD PRESSURE: 122 MMHG | TEMPERATURE: 98.8 F | HEART RATE: 88 BPM

## 2023-11-12 DIAGNOSIS — F41.8 ANXIETY ABOUT HEALTH: ICD-10-CM

## 2023-11-12 DIAGNOSIS — R11.2 NAUSEA AND VOMITING, UNSPECIFIED VOMITING TYPE: Primary | ICD-10-CM

## 2023-11-12 LAB
ALBUMIN SERPL-MCNC: 5 G/DL (ref 3.5–5.2)
ALBUMIN/GLOB SERPL: 1.9 G/DL
ALP SERPL-CCNC: 57 U/L (ref 39–117)
ALT SERPL W P-5'-P-CCNC: 23 U/L (ref 1–41)
ANION GAP SERPL CALCULATED.3IONS-SCNC: 11.8 MMOL/L (ref 5–15)
AST SERPL-CCNC: 18 U/L (ref 1–40)
BASOPHILS # BLD AUTO: 0.05 10*3/MM3 (ref 0–0.2)
BASOPHILS NFR BLD AUTO: 0.5 % (ref 0–1.5)
BILIRUB SERPL-MCNC: 1 MG/DL (ref 0–1.2)
BILIRUB UR QL STRIP: NEGATIVE
BUN SERPL-MCNC: 13 MG/DL (ref 6–20)
BUN/CREAT SERPL: 17.8 (ref 7–25)
CALCIUM SPEC-SCNC: 9.9 MG/DL (ref 8.6–10.5)
CHLORIDE SERPL-SCNC: 99 MMOL/L (ref 98–107)
CLARITY UR: CLEAR
CO2 SERPL-SCNC: 27.2 MMOL/L (ref 22–29)
COLOR UR: ABNORMAL
CREAT SERPL-MCNC: 0.73 MG/DL (ref 0.76–1.27)
DEPRECATED RDW RBC AUTO: 41.3 FL (ref 37–54)
EGFRCR SERPLBLD CKD-EPI 2021: 129.5 ML/MIN/1.73
EOSINOPHIL # BLD AUTO: 0.05 10*3/MM3 (ref 0–0.4)
EOSINOPHIL NFR BLD AUTO: 0.5 % (ref 0.3–6.2)
ERYTHROCYTE [DISTWIDTH] IN BLOOD BY AUTOMATED COUNT: 12.5 % (ref 12.3–15.4)
FLUAV SUBTYP SPEC NAA+PROBE: NOT DETECTED
FLUBV RNA ISLT QL NAA+PROBE: NOT DETECTED
GLOBULIN UR ELPH-MCNC: 2.6 GM/DL
GLUCOSE SERPL-MCNC: 83 MG/DL (ref 65–99)
GLUCOSE UR STRIP-MCNC: NEGATIVE MG/DL
HCT VFR BLD AUTO: 45.5 % (ref 37.5–51)
HGB BLD-MCNC: 15.1 G/DL (ref 13–17.7)
HGB UR QL STRIP.AUTO: ABNORMAL
IMM GRANULOCYTES # BLD AUTO: 0.01 10*3/MM3 (ref 0–0.05)
IMM GRANULOCYTES NFR BLD AUTO: 0.1 % (ref 0–0.5)
KETONES UR QL STRIP: ABNORMAL
LEUKOCYTE ESTERASE UR QL STRIP.AUTO: NEGATIVE
LIPASE SERPL-CCNC: 31 U/L (ref 13–60)
LYMPHOCYTES # BLD AUTO: 1.72 10*3/MM3 (ref 0.7–3.1)
LYMPHOCYTES NFR BLD AUTO: 16.9 % (ref 19.6–45.3)
MCH RBC QN AUTO: 29.5 PG (ref 26.6–33)
MCHC RBC AUTO-ENTMCNC: 33.2 G/DL (ref 31.5–35.7)
MCV RBC AUTO: 89 FL (ref 79–97)
MONOCYTES # BLD AUTO: 0.47 10*3/MM3 (ref 0.1–0.9)
MONOCYTES NFR BLD AUTO: 4.6 % (ref 5–12)
NEUTROPHILS NFR BLD AUTO: 7.89 10*3/MM3 (ref 1.7–7)
NEUTROPHILS NFR BLD AUTO: 77.4 % (ref 42.7–76)
NITRITE UR QL STRIP: NEGATIVE
PH UR STRIP.AUTO: 6 [PH] (ref 5–8)
PLATELET # BLD AUTO: 227 10*3/MM3 (ref 140–450)
PMV BLD AUTO: 10.3 FL (ref 6–12)
POTASSIUM SERPL-SCNC: 3.7 MMOL/L (ref 3.5–5.2)
PROT SERPL-MCNC: 7.6 G/DL (ref 6–8.5)
PROT UR QL STRIP: ABNORMAL
RBC # BLD AUTO: 5.11 10*6/MM3 (ref 4.14–5.8)
SARS-COV-2 RNA RESP QL NAA+PROBE: NOT DETECTED
SODIUM SERPL-SCNC: 138 MMOL/L (ref 136–145)
SP GR UR STRIP: >=1.03 (ref 1–1.03)
UROBILINOGEN UR QL STRIP: ABNORMAL
WBC NRBC COR # BLD: 10.19 10*3/MM3 (ref 3.4–10.8)

## 2023-11-12 PROCEDURE — 87636 SARSCOV2 & INF A&B AMP PRB: CPT

## 2023-11-12 PROCEDURE — 85025 COMPLETE CBC W/AUTO DIFF WBC: CPT | Performed by: PEDIATRICS

## 2023-11-12 PROCEDURE — 80053 COMPREHEN METABOLIC PANEL: CPT | Performed by: PEDIATRICS

## 2023-11-12 PROCEDURE — 25810000003 LACTATED RINGERS PER 1000 ML: Performed by: PEDIATRICS

## 2023-11-12 PROCEDURE — 83690 ASSAY OF LIPASE: CPT | Performed by: PEDIATRICS

## 2023-11-12 PROCEDURE — 99283 EMERGENCY DEPT VISIT LOW MDM: CPT

## 2023-11-12 PROCEDURE — 81003 URINALYSIS AUTO W/O SCOPE: CPT | Performed by: PEDIATRICS

## 2023-11-12 RX ORDER — SODIUM CHLORIDE, SODIUM LACTATE, POTASSIUM CHLORIDE, CALCIUM CHLORIDE 600; 310; 30; 20 MG/100ML; MG/100ML; MG/100ML; MG/100ML
1000 INJECTION, SOLUTION INTRAVENOUS ONCE
Status: COMPLETED | OUTPATIENT
Start: 2023-11-12 | End: 2023-11-12

## 2023-11-12 RX ORDER — ONDANSETRON 4 MG/1
4 TABLET, ORALLY DISINTEGRATING ORAL EVERY 8 HOURS PRN
Qty: 30 TABLET | Refills: 0 | Status: SHIPPED | OUTPATIENT
Start: 2023-11-12 | End: 2023-11-22

## 2023-11-12 RX ADMIN — SODIUM CHLORIDE, POTASSIUM CHLORIDE, SODIUM LACTATE AND CALCIUM CHLORIDE 1000 ML: 600; 310; 30; 20 INJECTION, SOLUTION INTRAVENOUS at 19:00

## 2023-11-13 NOTE — FSED PROVIDER NOTE
Emergency Medicine Evaluation Note  Subjective   History of Present Illness    HPI: Sj Lester is a 23 y.o. male who presents to the ED with complaints of 3-day history of jitteriness, nausea with a few episodes of nonbloody nonbilious emesis that began after cessation of previous course of BuSpar 7.5 mg twice daily without decreasing taper.  Patient was then initiated on hydroxyzine 10 mg, states after dose of hydroxyzine, became excessively tired, last dose yesterday.  Patient with longstanding history of gastritis, currently taking pantoprazole with OTC antacids as needed.  States mild achy abdominal pain after 1 episode of emesis, transient nature, subsequent resolved.  Denies any current abdominal pain.  Patient also states associated subjective fevers, no temperature check.  No antipyretics prior to arrival.  Patient states Zofran roughly 3 hours prior to presentation with significant improvement in nausea and vomiting.  Patient states dark urine this a.m., without associated symptoms.  No bowel symptoms.  No other concomitant symptoms. No other known aggravating or alleviating factors.      ROS  Review of Systems   Constitutional:  Positive for fever. Negative for activity change, appetite change and chills.   HENT:  Negative for congestion, ear discharge, ear pain, facial swelling, postnasal drip, sinus pressure, sinus pain, sore throat, trouble swallowing and voice change.    Eyes: Negative.    Respiratory: Negative.  Negative for cough, choking, chest tightness, shortness of breath and wheezing.    Cardiovascular: Negative.  Negative for chest pain and palpitations.   Gastrointestinal:  Positive for nausea and vomiting. Negative for abdominal distention, abdominal pain, anal bleeding, blood in stool, constipation, diarrhea and rectal pain.   Endocrine: Negative.    Genitourinary:  Negative for decreased urine volume, difficulty urinating, dysuria, enuresis, flank pain, frequency, genital sores,  "hematuria, penile discharge, penile pain, penile swelling, scrotal swelling, testicular pain and urgency.   Musculoskeletal: Negative.    Skin: Negative.    Allergic/Immunologic: Negative.    Neurological: Negative.    Hematological: Negative.    Psychiatric/Behavioral: Negative.         Previous History:  Past Medical History:   Diagnosis Date    Allergic rhinitis     Chronic sinusitis     Obesity     MEGAN on CPAP     AHI 35/h    S/P correction of deviated nasal septum      Past Surgical History:   Procedure Laterality Date    NASAL SEPTUM SURGERY      SINUS SURGERY       Social History     Tobacco Use    Smoking status: Former    Smokeless tobacco: Never   Substance Use Topics    Alcohol use: Yes    Drug use: No     Family History   Problem Relation Age of Onset    Sleep apnea Father      No Known Allergies  Current Outpatient Medications   Medication Instructions    cetirizine (ZYRTEC) 5 mg, Oral, Daily    ondansetron ODT (ZOFRAN-ODT) 4 mg, Translingual, Every 8 Hours PRN       Objective   Physical Exam  Patient Vitals for the past 24 hrs:   BP Temp Temp src Pulse Resp SpO2 Height Weight   11/12/23 1901 125/76 -- -- 93 18 98 % -- --   11/12/23 1645 150/93 98.8 °F (37.1 °C) Temporal 105 18 98 % 175.3 cm (69\") 108 kg (238 lb)     Physical Exam  Vitals and nursing note reviewed.   Constitutional:       General: He is not in acute distress.     Appearance: He is normal weight. He is not toxic-appearing.   HENT:      Head: Normocephalic and atraumatic.      Nose: Nose normal. No congestion.      Mouth/Throat:      Mouth: Mucous membranes are moist.      Pharynx: Oropharynx is clear.   Eyes:      General:         Right eye: No discharge.         Left eye: No discharge.      Extraocular Movements: Extraocular movements intact.      Conjunctiva/sclera: Conjunctivae normal.   Cardiovascular:      Rate and Rhythm: Normal rate and regular rhythm.      Pulses: Normal pulses.      Heart sounds: Normal heart sounds. No murmur " heard.  Pulmonary:      Effort: Pulmonary effort is normal. No respiratory distress.      Breath sounds: Normal breath sounds. No wheezing, rhonchi or rales.   Chest:      Chest wall: No tenderness.   Abdominal:      General: Abdomen is flat. There is no distension.      Palpations: Abdomen is soft.      Tenderness: There is no abdominal tenderness. There is no right CVA tenderness, left CVA tenderness, guarding or rebound.   Musculoskeletal:         General: No swelling, tenderness, deformity or signs of injury. Normal range of motion.      Cervical back: Normal range of motion and neck supple. No rigidity or tenderness.      Right lower leg: No edema.      Left lower leg: No edema.   Skin:     General: Skin is warm and dry.      Capillary Refill: Capillary refill takes less than 2 seconds.   Neurological:      General: No focal deficit present.      Mental Status: He is alert and oriented to person, place, and time. Mental status is at baseline.      Cranial Nerves: No cranial nerve deficit.      Sensory: No sensory deficit.      Motor: No weakness.      Coordination: Coordination normal.      Gait: Gait normal.      Deep Tendon Reflexes: Reflexes normal.   Psychiatric:         Mood and Affect: Mood normal.         Behavior: Behavior normal.         Results  Labs Reviewed   COMPREHENSIVE METABOLIC PANEL - Abnormal; Notable for the following components:       Result Value    Creatinine 0.73 (*)     All other components within normal limits    Narrative:     GFR Normal >60  Chronic Kidney Disease <60  Kidney Failure <15     URINALYSIS WITHOUT MICROSCOPIC (NO CULTURE) - Abnormal; Notable for the following components:    Color, UA Dark Yellow (*)     Ketones, UA >=160 mg/dL (4+) (*)     Blood, UA Trace (*)     Protein, UA 30 mg/dL (1+) (*)     All other components within normal limits   CBC WITH AUTO DIFFERENTIAL - Abnormal; Notable for the following components:    Neutrophil % 77.4 (*)     Lymphocyte % 16.9 (*)      Monocyte % 4.6 (*)     Neutrophils, Absolute 7.89 (*)     All other components within normal limits   COVID-19 AND FLU A/B, NP SWAB IN TRANSPORT MEDIA 1 HR TAT - Normal    Narrative:     Fact sheet for providers: https://www.fda.gov/media/064077/download    Fact sheet for patients: https://www.fda.gov/media/301312/download    Test performed by PCR.   LIPASE - Normal   CBC AND DIFFERENTIAL    Narrative:     The following orders were created for panel order CBC & Differential.  Procedure                               Abnormality         Status                     ---------                               -----------         ------                     CBC Auto Differential[[id number]]        Abnormal            Final result                 Please view results for these tests on the individual orders.     No orders to display     The laboratory results, imaging results and other diagnostic exam results were reviewed in the EMR.     Procedures  Procedures    Medical Decision Making    Patient presents to the ED as described above.  Vital signs stable and unremarkable.  Physical exam as described above.  Urine with ketones, no evidence of infection.  Blood work without acute abnormality.  Patient resting comfortably no distress on subsequent evaluations, continues to deny need for analgesia.  Tolerating p.o. without difficulty.  Initial mild tachycardia, completely resolved after fluid bolus.  Patient with previously scheduled appointment with PCP tomorrow, discussed titration and initiation of different psychiatric medications.  Discussed safe use of prescribed and OTC medications for symptomatic care as an outpatient. Patient and family at bedside were informed of results.  They verbalized understanding and agreement with treatment care plan.  Given strict return precautions.  All questions answered.    Diagnosis  Final diagnoses:   Nausea and vomiting, unspecified vomiting type   Anxiety about health       Disposition  ED  Disposition       ED Disposition   Discharge    Condition   Stable    Comment   --             PATIENT CONNECTION - [facility]  [facility] [address]  [phone]  Call in 1 week  for contunity of care, as needed    [place] [address] [place] [id number] [phone]  Go to   As needed, If symptoms worsen

## 2023-11-13 NOTE — ED NOTES
Pt's mother at bedside present during education and discharge summary. Patient as well as parent verbalized understanding of medication prescribed as well as follow up with PCP.

## 2023-11-20 ENCOUNTER — HOSPITAL ENCOUNTER (EMERGENCY)
Facility: HOSPITAL | Age: 23
Discharge: HOME | End: 2023-11-20
Attending: EMERGENCY MEDICINE | Admitting: EMERGENCY MEDICINE
Payer: COMMERCIAL

## 2023-11-20 VITALS
BODY MASS INDEX: 33.33 KG/M2 | HEART RATE: 78 BPM | RESPIRATION RATE: 18 BRPM | HEIGHT: 69 IN | WEIGHT: 225 LBS | OXYGEN SATURATION: 98 % | SYSTOLIC BLOOD PRESSURE: 130 MMHG | DIASTOLIC BLOOD PRESSURE: 88 MMHG | TEMPERATURE: 98.5 F

## 2023-11-20 DIAGNOSIS — R19.7 DIARRHEA, UNSPECIFIED TYPE: ICD-10-CM

## 2023-11-20 DIAGNOSIS — K20.90 ESOPHAGITIS: Primary | ICD-10-CM

## 2023-11-20 PROCEDURE — 96361 HYDRATE IV INFUSION ADD-ON: CPT

## 2023-11-20 PROCEDURE — 99283 EMERGENCY DEPT VISIT LOW MDM: CPT

## 2023-11-20 PROCEDURE — 25810000003 SODIUM CHLORIDE 0.9 % SOLUTION: Performed by: EMERGENCY MEDICINE

## 2023-11-20 PROCEDURE — 96360 HYDRATION IV INFUSION INIT: CPT

## 2023-11-20 RX ORDER — PROCHLORPERAZINE MALEATE 10 MG
10 TABLET ORAL EVERY 6 HOURS PRN
Qty: 20 TABLET | Refills: 0 | Status: SHIPPED | OUTPATIENT
Start: 2023-11-20 | End: 2023-11-25

## 2023-11-20 RX ORDER — SODIUM CHLORIDE 9 MG/ML
125 INJECTION, SOLUTION INTRAVENOUS CONTINUOUS
Status: DISCONTINUED | OUTPATIENT
Start: 2023-11-20 | End: 2023-11-20 | Stop reason: HOSPADM

## 2023-11-20 RX ORDER — ALUMINA, MAGNESIA, AND SIMETHICONE 2400; 2400; 240 MG/30ML; MG/30ML; MG/30ML
30 SUSPENSION ORAL ONCE
Status: COMPLETED | OUTPATIENT
Start: 2023-11-20 | End: 2023-11-20

## 2023-11-20 RX ADMIN — SODIUM CHLORIDE 125 ML/HR: 9 INJECTION, SOLUTION INTRAVENOUS at 18:24

## 2023-11-20 RX ADMIN — ALUMINUM HYDROXIDE, MAGNESIUM HYDROXIDE, AND DIMETHICONE 30 ML: 400; 400; 40 SUSPENSION ORAL at 18:23

## 2023-11-20 NOTE — Clinical Note
[facility] [place] [address] [place] [address] [place] [address]  Phone: [phone]    Sj Lester was seen and treated in our emergency department on 11/20/2023.  He may return to work on 11/26/2023.         Thank you for choosing [facility].    Jeff Toscano MD

## 2023-11-20 NOTE — FSED PROVIDER NOTE
Subjective   History of Present Illness  This is a 23-year-old gentleman with history of Mariscal's esophagus, generalized anxiety disorder presenting for evaluation of nausea, p.o. intolerance, and discomfort of the back of the throat.  He was on buspirone for anxiety for 1 month started having side effects and this was stopped cold turkey.  The patient was prescribed sertraline he took that for 1 week started having side effects and was recommended to stop that medication today.  Zofran gives him a headache so he has not been taking that there was a prior ER visit which I reviewed.  He is on pantoprazole 40 mg twice daily.  Patient and his mother concern for dehydration and they are requesting a liter of IV saline. he also reports watery diarrhea    History provided by:  Parent, medical records and patient      Review of Systems   Gastrointestinal:  Positive for diarrhea, nausea and vomiting.   All other systems reviewed and are negative.      Past Medical History:   Diagnosis Date    Allergic rhinitis     Chronic sinusitis     Obesity     MEGAN on CPAP     AHI 35/h    S/P correction of deviated nasal septum        No Known Allergies    Past Surgical History:   Procedure Laterality Date    NASAL SEPTUM SURGERY      SINUS SURGERY         Family History   Problem Relation Age of Onset    Sleep apnea Father        Social History     Socioeconomic History    Marital status: Single   Tobacco Use    Smoking status: Former    Smokeless tobacco: Never   Substance and Sexual Activity    Alcohol use: Yes    Drug use: No           Objective   Physical Exam  Constitutional:       Appearance: Normal appearance.   HENT:      Mouth/Throat:      Mouth: Mucous membranes are moist.   Cardiovascular:      Rate and Rhythm: Normal rate and regular rhythm.   Pulmonary:      Effort: Pulmonary effort is normal.      Breath sounds: Normal breath sounds.   Abdominal:      General: Abdomen is flat.      Palpations: Abdomen is soft.       Tenderness: There is no abdominal tenderness.   Musculoskeletal:         General: No deformity.   Neurological:      General: No focal deficit present.      Mental Status: He is alert and oriented to person, place, and time.   Psychiatric:         Mood and Affect: Mood normal.         Behavior: Behavior normal.         Procedures           ED Course                                           Medical Decision Making  The abdomen is soft and nontender.  I believe the patient is suffering from symptoms of esophagitis which she already has as a baseline that he takes PPIs for this has been exacerbated by the vomiting which is likely secondary to a drug side effect versus withdrawal reaction or could also be an acute gastroenteritis since it is associated with diarrhea.  I recommended Imodium for the diarrhea and we administered 1 L of IV saline.  We will try Compazine instead of Zofran as a discharge and gave the patient a recommendation to not stop the sertraline cold turkey but to start at 12-1/2 mg.  He has a follow-up appointment scheduled with a psychiatrist 2 days from now    Amount and/or Complexity of Data Reviewed  Independent Historian: parent  External Data Reviewed: notes.  Labs: ordered. Decision-making details documented in ED Course.    Risk  OTC drugs.  Prescription drug management.        Final diagnoses:   Esophagitis   Diarrhea, unspecified type       ED Disposition  ED Disposition       ED Disposition   Discharge    Condition   Stable    Comment   --               Le Keenan, APRN  [address] [facility] [place] [address] [phone]    Schedule an appointment as soon as possible for a visit in 1 week        Keep follow-up appointment with your psychiatrist as scheduled.  Return to ER symptoms worsen or change             Medication List        New Prescriptions      prochlorperazine 10 MG tablet  Commonly known as: COMPAZINE  Take 1 tablet by mouth Every 6 (Six) Hours As Needed for  Nausea or Vomiting for up to 5 days.               Where to Get Your Medications        These medications were sent to [facility]/pharmacy #[id number] - [place], [place] - [address] [facility] - [phone]  - [phone] [address] [place] [address]      Hours: 24-hours Phone: [phone]   prochlorperazine 10 MG tablet

## 2023-11-20 NOTE — DISCHARGE INSTRUCTIONS
Do not stop sertraline abruptly but slowly taper your dose done over the next several days. Take medication as prescribed. Follow-up with your Psychiatrist in 2 days. Return if problems.

## 2023-11-21 NOTE — FSED PROVIDER NOTE
Pt saritha prado. Reviewed Dr Toscano's treatment plan with patient and family. They verbalized understanding and are agreeable. Answered all questions and concerns.

## 2024-01-16 ENCOUNTER — OFFICE (AMBULATORY)
Dept: URBAN - METROPOLITAN AREA CLINIC 64 | Facility: CLINIC | Age: 24
End: 2024-01-16

## 2024-01-16 VITALS
HEIGHT: 69 IN | DIASTOLIC BLOOD PRESSURE: 63 MMHG | WEIGHT: 254 LBS | HEART RATE: 95 BPM | SYSTOLIC BLOOD PRESSURE: 126 MMHG

## 2024-01-16 DIAGNOSIS — K92.1 MELENA: ICD-10-CM

## 2024-01-16 DIAGNOSIS — R11.2 NAUSEA WITH VOMITING, UNSPECIFIED: ICD-10-CM

## 2024-01-16 DIAGNOSIS — R19.8 OTHER SPECIFIED SYMPTOMS AND SIGNS INVOLVING THE DIGESTIVE S: ICD-10-CM

## 2024-01-16 DIAGNOSIS — K21.00 GASTRO-ESOPHAGEAL REFLUX DISEASE WITH ESOPHAGITIS, WITHOUT B: ICD-10-CM

## 2024-01-16 PROCEDURE — 99213 OFFICE O/P EST LOW 20 MIN: CPT | Performed by: NURSE PRACTITIONER

## 2024-01-16 RX ORDER — FAMOTIDINE 40 MG/1
TABLET, FILM COATED ORAL
Qty: 90 | Refills: 0 | Status: ACTIVE

## 2024-01-23 ENCOUNTER — ON CAMPUS - OUTPATIENT (AMBULATORY)
Dept: URBAN - METROPOLITAN AREA HOSPITAL 2 | Facility: HOSPITAL | Age: 24
End: 2024-01-23

## 2024-01-23 ENCOUNTER — OFFICE (AMBULATORY)
Dept: URBAN - METROPOLITAN AREA PATHOLOGY 4 | Facility: PATHOLOGY | Age: 24
End: 2024-01-23
Payer: COMMERCIAL

## 2024-01-23 VITALS
DIASTOLIC BLOOD PRESSURE: 79 MMHG | DIASTOLIC BLOOD PRESSURE: 66 MMHG | OXYGEN SATURATION: 100 % | HEART RATE: 81 BPM | SYSTOLIC BLOOD PRESSURE: 133 MMHG | TEMPERATURE: 98.6 F | HEART RATE: 82 BPM | DIASTOLIC BLOOD PRESSURE: 81 MMHG | SYSTOLIC BLOOD PRESSURE: 119 MMHG | RESPIRATION RATE: 16 BRPM | RESPIRATION RATE: 14 BRPM | SYSTOLIC BLOOD PRESSURE: 107 MMHG | WEIGHT: 246 LBS | SYSTOLIC BLOOD PRESSURE: 114 MMHG | HEART RATE: 97 BPM | HEIGHT: 69 IN | OXYGEN SATURATION: 97 % | SYSTOLIC BLOOD PRESSURE: 116 MMHG | HEART RATE: 75 BPM | RESPIRATION RATE: 12 BRPM | DIASTOLIC BLOOD PRESSURE: 65 MMHG | RESPIRATION RATE: 18 BRPM | RESPIRATION RATE: 19 BRPM

## 2024-01-23 DIAGNOSIS — K44.9 DIAPHRAGMATIC HERNIA WITHOUT OBSTRUCTION OR GANGRENE: ICD-10-CM

## 2024-01-23 DIAGNOSIS — K22.70 BARRETT'S ESOPHAGUS WITHOUT DYSPLASIA: ICD-10-CM

## 2024-01-23 DIAGNOSIS — R11.2 NAUSEA WITH VOMITING, UNSPECIFIED: ICD-10-CM

## 2024-01-23 DIAGNOSIS — K21.00 GASTRO-ESOPHAGEAL REFLUX DISEASE WITH ESOPHAGITIS, WITHOUT B: ICD-10-CM

## 2024-01-23 PROBLEM — K20.90 ESOPHAGITIS, UNSPECIFIED WITHOUT BLEEDING: Status: ACTIVE | Noted: 2024-01-23

## 2024-01-23 PROCEDURE — 88305 TISSUE EXAM BY PATHOLOGIST: CPT | Mod: 26 | Performed by: INTERNAL MEDICINE

## 2024-01-23 PROCEDURE — 43239 EGD BIOPSY SINGLE/MULTIPLE: CPT | Performed by: INTERNAL MEDICINE

## 2024-01-23 NOTE — SERVICEHPINOTES
VINAYAK PEREZ  is a  23  male   who presents today for a  EGD   for   the indications listed below. The updated Patient Profile was reviewed prior to the procedure, in conjunction with the Physical Exam, including medical conditions, surgical procedures, medications, allergies, family history and social history. See Physical Exam time stamp below for date and time of HPI completion.Pre-operatively, I reviewed the indication(s) for the procedure, the risks of the procedure [including but not limited to: unexpected bleeding possibly requiring hospitalization and/or unplanned repeat procedures, perforation possibly requiring surgical treatment, missed lesions and complications of sedation/general anesthesia (also explained by anesthesia staff)]. I have evaluated the patient for risks associated with the planned anesthesia and the procedure to be performed and find the patient an acceptable candidate for IV sedation.Multiple opportunities were provided for any questions or concerns, and all questions were answered satisfactorily before any anesthesia was administered. We will proceed with the planned procedure.br

## 2024-02-12 ENCOUNTER — OFFICE (AMBULATORY)
Dept: URBAN - METROPOLITAN AREA CLINIC 64 | Facility: CLINIC | Age: 24
End: 2024-02-12

## 2024-02-12 VITALS
DIASTOLIC BLOOD PRESSURE: 71 MMHG | HEART RATE: 86 BPM | HEIGHT: 69 IN | SYSTOLIC BLOOD PRESSURE: 120 MMHG | WEIGHT: 246 LBS

## 2024-02-12 DIAGNOSIS — K31.84 GASTROPARESIS: ICD-10-CM

## 2024-02-12 DIAGNOSIS — R11.0 NAUSEA: ICD-10-CM

## 2024-02-12 DIAGNOSIS — K21.00 GASTRO-ESOPHAGEAL REFLUX DISEASE WITH ESOPHAGITIS, WITHOUT B: ICD-10-CM

## 2024-02-12 DIAGNOSIS — K22.70 BARRETT'S ESOPHAGUS WITHOUT DYSPLASIA: ICD-10-CM

## 2024-02-12 PROBLEM — K21.0 GASTRO-ESOPHAGEAL REFLUX DISEASE WITH ESOPHAGITIS: Status: ACTIVE | Noted: 2022-09-06

## 2024-02-12 PROCEDURE — 99213 OFFICE O/P EST LOW 20 MIN: CPT | Performed by: NURSE PRACTITIONER

## 2024-02-12 RX ORDER — OMEPRAZOLE 40 MG/1
80 CAPSULE, DELAYED RELEASE ORAL
Qty: 60 | Refills: 12 | Status: ACTIVE
Start: 2024-02-12

## 2024-04-30 ENCOUNTER — HOSPITAL ENCOUNTER (EMERGENCY)
Facility: HOSPITAL | Age: 24
Discharge: HOME | End: 2024-04-30
Attending: EMERGENCY MEDICINE
Payer: COMMERCIAL

## 2024-04-30 ENCOUNTER — APPOINTMENT (OUTPATIENT)
Dept: GENERAL RADIOLOGY | Facility: HOSPITAL | Age: 24
End: 2024-04-30
Payer: COMMERCIAL

## 2024-04-30 VITALS
RESPIRATION RATE: 20 BRPM | OXYGEN SATURATION: 99 % | DIASTOLIC BLOOD PRESSURE: 85 MMHG | TEMPERATURE: 97.5 F | BODY MASS INDEX: 33.78 KG/M2 | HEIGHT: 69 IN | SYSTOLIC BLOOD PRESSURE: 127 MMHG | HEART RATE: 106 BPM | WEIGHT: 228.1 LBS

## 2024-04-30 DIAGNOSIS — R07.9 CHEST PAIN, UNSPECIFIED TYPE: Primary | ICD-10-CM

## 2024-04-30 LAB
ALBUMIN SERPL-MCNC: 4.7 G/DL (ref 3.5–5.2)
ALBUMIN/GLOB SERPL: 2 G/DL
ALP SERPL-CCNC: 60 U/L (ref 39–117)
ALT SERPL W P-5'-P-CCNC: 19 U/L (ref 1–41)
ANION GAP SERPL CALCULATED.3IONS-SCNC: 9.5 MMOL/L (ref 5–15)
AST SERPL-CCNC: 14 U/L (ref 1–40)
BASOPHILS # BLD AUTO: 0.04 10*3/MM3 (ref 0–0.2)
BASOPHILS NFR BLD AUTO: 0.7 % (ref 0–1.5)
BILIRUB SERPL-MCNC: 0.8 MG/DL (ref 0–1.2)
BUN SERPL-MCNC: 10 MG/DL (ref 6–20)
BUN/CREAT SERPL: 13.5 (ref 7–25)
CALCIUM SPEC-SCNC: 9.8 MG/DL (ref 8.6–10.5)
CHLORIDE SERPL-SCNC: 101 MMOL/L (ref 98–107)
CO2 SERPL-SCNC: 28.5 MMOL/L (ref 22–29)
CREAT SERPL-MCNC: 0.74 MG/DL (ref 0.76–1.27)
D DIMER PPP FEU-MCNC: 0.2 MCGFEU/ML (ref 0–0.5)
DEPRECATED RDW RBC AUTO: 41.9 FL (ref 37–54)
EGFRCR SERPLBLD CKD-EPI 2021: 128.2 ML/MIN/1.73
EOSINOPHIL # BLD AUTO: 0.1 10*3/MM3 (ref 0–0.4)
EOSINOPHIL NFR BLD AUTO: 1.7 % (ref 0.3–6.2)
ERYTHROCYTE [DISTWIDTH] IN BLOOD BY AUTOMATED COUNT: 12.5 % (ref 12.3–15.4)
GLOBULIN UR ELPH-MCNC: 2.4 GM/DL
GLUCOSE SERPL-MCNC: 94 MG/DL (ref 65–99)
HCT VFR BLD AUTO: 45 % (ref 37.5–51)
HGB BLD-MCNC: 14.8 G/DL (ref 13–17.7)
IMM GRANULOCYTES # BLD AUTO: 0 10*3/MM3 (ref 0–0.05)
IMM GRANULOCYTES NFR BLD AUTO: 0 % (ref 0–0.5)
LYMPHOCYTES # BLD AUTO: 0.95 10*3/MM3 (ref 0.7–3.1)
LYMPHOCYTES NFR BLD AUTO: 15.9 % (ref 19.6–45.3)
MCH RBC QN AUTO: 29.7 PG (ref 26.6–33)
MCHC RBC AUTO-ENTMCNC: 32.9 G/DL (ref 31.5–35.7)
MCV RBC AUTO: 90.2 FL (ref 79–97)
MONOCYTES # BLD AUTO: 0.36 10*3/MM3 (ref 0.1–0.9)
MONOCYTES NFR BLD AUTO: 6 % (ref 5–12)
NEUTROPHILS NFR BLD AUTO: 4.52 10*3/MM3 (ref 1.7–7)
NEUTROPHILS NFR BLD AUTO: 75.7 % (ref 42.7–76)
PLATELET # BLD AUTO: 185 10*3/MM3 (ref 140–450)
PMV BLD AUTO: 10.3 FL (ref 6–12)
POTASSIUM SERPL-SCNC: 4.1 MMOL/L (ref 3.5–5.2)
PROT SERPL-MCNC: 7.1 G/DL (ref 6–8.5)
QT INTERVAL: 339 MS
QTC INTERVAL: 424 MS
RBC # BLD AUTO: 4.99 10*6/MM3 (ref 4.14–5.8)
SODIUM SERPL-SCNC: 139 MMOL/L (ref 136–145)
TROPONIN T SERPL HS-MCNC: <6 NG/L
WBC NRBC COR # BLD AUTO: 5.97 10*3/MM3 (ref 3.4–10.8)

## 2024-04-30 PROCEDURE — 84484 ASSAY OF TROPONIN QUANT: CPT | Performed by: EMERGENCY MEDICINE

## 2024-04-30 PROCEDURE — 93010 ELECTROCARDIOGRAM REPORT: CPT | Performed by: EMERGENCY MEDICINE

## 2024-04-30 PROCEDURE — 99284 EMERGENCY DEPT VISIT MOD MDM: CPT | Performed by: EMERGENCY MEDICINE

## 2024-04-30 PROCEDURE — 71046 X-RAY EXAM CHEST 2 VIEWS: CPT

## 2024-04-30 PROCEDURE — 85379 FIBRIN DEGRADATION QUANT: CPT | Performed by: EMERGENCY MEDICINE

## 2024-04-30 PROCEDURE — 85025 COMPLETE CBC W/AUTO DIFF WBC: CPT | Performed by: EMERGENCY MEDICINE

## 2024-04-30 PROCEDURE — 80053 COMPREHEN METABOLIC PANEL: CPT | Performed by: EMERGENCY MEDICINE

## 2024-04-30 PROCEDURE — 99284 EMERGENCY DEPT VISIT MOD MDM: CPT

## 2024-04-30 PROCEDURE — 93005 ELECTROCARDIOGRAM TRACING: CPT | Performed by: EMERGENCY MEDICINE

## 2024-04-30 RX ORDER — SODIUM CHLORIDE 0.9 % (FLUSH) 0.9 %
10 SYRINGE (ML) INJECTION AS NEEDED
Status: DISCONTINUED | OUTPATIENT
Start: 2024-04-30 | End: 2024-04-30 | Stop reason: HOSPADM

## 2024-04-30 NOTE — Clinical Note
[facility] [place] [address] [place] [address] [place] [address]  Phone: [phone]    Sj Lester was seen and treated in our emergency department on 4/30/2024.  He may return to work on 05/01/2024.         Thank you for choosing [facility].    Lex Perdue MD

## 2024-04-30 NOTE — DISCHARGE INSTRUCTIONS
Follow-up with your doctor as needed.  Take ibuprofen or Tylenol as needed for pain.  If you develop dyspnea on exertion, exertional chest pain or worsening symptoms please return for reevaluation.

## 2024-04-30 NOTE — FSED PROVIDER NOTE
Subjective   History of Present Illness  24-year-old male presents complaining of chest pain in his upper chest since last night.  He says he felt like it was squeezing around his upper chest and neck.  Says it feels tight to the touch but also when he breathes.  No radiation of chest pain, no fevers no chills.  Says he does have anxiety which has been worse recently since they increased his amitriptyline dose but does not feel like anxiety to him.  Also has Mariscal's esophagus for which she takes a PPI   Review of Systems   Respiratory:  Negative for shortness of breath.    Cardiovascular:         As noted in HPI       Past Medical History:   Diagnosis Date    Allergic rhinitis     Chronic sinusitis     Obesity     MEGAN on CPAP     AHI 35/h    S/P correction of deviated nasal septum        No Known Allergies    Past Surgical History:   Procedure Laterality Date    NASAL SEPTUM SURGERY      SINUS SURGERY         Family History   Problem Relation Age of Onset    Sleep apnea Father        Social History     Socioeconomic History    Marital status: Single   Tobacco Use    Smoking status: Former    Smokeless tobacco: Never   Vaping Use    Vaping status: Never Used   Substance and Sexual Activity    Alcohol use: Yes    Drug use: No    Sexual activity: Defer           Objective   Physical Exam  Nursing note reviewed.  INITIAL VITAL SIGNS: Reviewed by me.  Pulse ox normal  GENERAL: Alert and interactive. No acute distress.  HEAD: Head is normocephalic.  EYES: EOMI. PERRL. No scleral icterus. No conjunctival injection.  ENT: Moist mucous membranes.   NECK: Supple. Full range of motion.  RESPIRATORY: No tachypnea. Clear breath sounds bilaterally. No wheezing. No rales. No rhonchi.  CV: Regular rate and rhythm. No murmurs. No rubs or gallops.  ABDOMEN: Soft, nondistended, nontender. No guarding. No rebound. No masses.   BACK:  No obvious deformity.  EXTREMITIES: No deformity. No clubbing or cyanosis. No edema.   SKIN: Warm and  dry. No diaphoresis. No obvious rashes.   NEUROLOGIC: Alert and oriented. Face is symmetric. Speech is normal. Moves all extremities equally. Motor and sensory distally intact.       Procedures     EKG         EKG time/Interp time: 1148/1158  Rhythm/Rate: Sinus rhythm 94  P waves and VA: Normal VA interval  QRS, axis: Normal QRS duration within normal axis  ST and T waves: No ST elevations or depressions concerning for ischemia  Independently interpreted by me contemporaneously with treatment        ED Course  ED Course as of 04/30/24 1312   Tue Apr 30, 2024   1220 Well-appearing 24-year-old male with history of anxiety and Mariscal's esophagus presents complaining of chest pain since last night.  Says it feels tight across his chest, it is somewhat reproducible by pressing on his chest though with his complaints think he deserves some workup.  Will rule out ACS and PE with laboratory testing as his story is not convincing enough for admission and provocative testing. [RO]      ED Course User Index  [RO] Lex Perdue MD                                           Medical Decision Making  Amount and/or Complexity of Data Reviewed  Labs: ordered.  Radiology: ordered.  ECG/medicine tests: ordered.    Risk  Prescription drug management.    Considered pneumothorax, PE, ACS but ultimately think these are unlikely given his history and physical and laboratory workup.  It seems more chest wall in nature versus anxiety versus relation to  esophagus.  His exam reveals a young man in no distress with normal heart sounds normal lung sounds.  His labs show normal kidney function normal LFTs normal CBC normal D-dimer normal troponin.  Chest x-ray is clear.  Patient is a low risk heart score of 1 due to his weight.    Final diagnoses:   Chest pain, unspecified type       ED Disposition  ED Disposition       ED Disposition   Discharge    Condition   Good    Comment   --               Le Keenan, APRN  [address]  [place] [address]  [phone]    Call   To schedule follow-up appointment         Medication List      No changes were made to your prescriptions during this visit.

## 2025-03-31 ENCOUNTER — HOSPITAL ENCOUNTER (EMERGENCY)
Facility: HOSPITAL | Age: 25
Discharge: HOME | End: 2025-03-31
Attending: EMERGENCY MEDICINE | Admitting: EMERGENCY MEDICINE
Payer: COMMERCIAL

## 2025-03-31 VITALS
HEART RATE: 100 BPM | WEIGHT: 254.5 LBS | OXYGEN SATURATION: 99 % | DIASTOLIC BLOOD PRESSURE: 76 MMHG | RESPIRATION RATE: 16 BRPM | SYSTOLIC BLOOD PRESSURE: 118 MMHG | BODY MASS INDEX: 37.69 KG/M2 | HEIGHT: 69 IN | TEMPERATURE: 98.4 F

## 2025-03-31 DIAGNOSIS — R19.7 VOMITING AND DIARRHEA: Primary | ICD-10-CM

## 2025-03-31 DIAGNOSIS — R11.10 VOMITING AND DIARRHEA: Primary | ICD-10-CM

## 2025-03-31 LAB
ALBUMIN SERPL-MCNC: 4.3 G/DL (ref 3.5–5.2)
ALBUMIN/GLOB SERPL: 1.5 G/DL
ALP SERPL-CCNC: 58 U/L (ref 39–117)
ALT SERPL W P-5'-P-CCNC: 22 U/L (ref 1–41)
ANION GAP SERPL CALCULATED.3IONS-SCNC: 11 MMOL/L (ref 5–15)
AST SERPL-CCNC: 23 U/L (ref 1–40)
BASOPHILS # BLD AUTO: 0.03 10*3/MM3 (ref 0–0.2)
BASOPHILS NFR BLD AUTO: 0.3 % (ref 0–1.5)
BILIRUB SERPL-MCNC: 0.6 MG/DL (ref 0–1.2)
BUN SERPL-MCNC: 10 MG/DL (ref 6–20)
BUN/CREAT SERPL: 15.6 (ref 7–25)
CALCIUM SPEC-SCNC: 9.3 MG/DL (ref 8.6–10.5)
CHLORIDE SERPL-SCNC: 103 MMOL/L (ref 98–107)
CO2 SERPL-SCNC: 26 MMOL/L (ref 22–29)
CREAT SERPL-MCNC: 0.64 MG/DL (ref 0.76–1.27)
DEPRECATED RDW RBC AUTO: 39.6 FL (ref 37–54)
EGFRCR SERPLBLD CKD-EPI 2021: 133.9 ML/MIN/1.73
EOSINOPHIL # BLD AUTO: 0.05 10*3/MM3 (ref 0–0.4)
EOSINOPHIL NFR BLD AUTO: 0.5 % (ref 0.3–6.2)
ERYTHROCYTE [DISTWIDTH] IN BLOOD BY AUTOMATED COUNT: 12.5 % (ref 12.3–15.4)
FLUAV SUBTYP SPEC NAA+PROBE: NOT DETECTED
FLUBV RNA ISLT QL NAA+PROBE: NOT DETECTED
GLOBULIN UR ELPH-MCNC: 2.9 GM/DL
GLUCOSE SERPL-MCNC: 90 MG/DL (ref 65–99)
HCT VFR BLD AUTO: 39.9 % (ref 37.5–51)
HGB BLD-MCNC: 13.5 G/DL (ref 13–17.7)
IMM GRANULOCYTES # BLD AUTO: 0.02 10*3/MM3 (ref 0–0.05)
IMM GRANULOCYTES NFR BLD AUTO: 0.2 % (ref 0–0.5)
LYMPHOCYTES # BLD AUTO: 1.51 10*3/MM3 (ref 0.7–3.1)
LYMPHOCYTES NFR BLD AUTO: 14.7 % (ref 19.6–45.3)
MCH RBC QN AUTO: 29.2 PG (ref 26.6–33)
MCHC RBC AUTO-ENTMCNC: 33.8 G/DL (ref 31.5–35.7)
MCV RBC AUTO: 86.4 FL (ref 79–97)
MONOCYTES # BLD AUTO: 0.57 10*3/MM3 (ref 0.1–0.9)
MONOCYTES NFR BLD AUTO: 5.6 % (ref 5–12)
NEUTROPHILS NFR BLD AUTO: 78.7 % (ref 42.7–76)
NEUTROPHILS NFR BLD AUTO: 8.07 10*3/MM3 (ref 1.7–7)
PLATELET # BLD AUTO: 226 10*3/MM3 (ref 140–450)
PMV BLD AUTO: 9.8 FL (ref 6–12)
POTASSIUM SERPL-SCNC: 3.2 MMOL/L (ref 3.5–5.2)
PROT SERPL-MCNC: 7.2 G/DL (ref 6–8.5)
RBC # BLD AUTO: 4.62 10*6/MM3 (ref 4.14–5.8)
SARS-COV-2 RNA RESP QL NAA+PROBE: NOT DETECTED
SODIUM SERPL-SCNC: 140 MMOL/L (ref 136–145)
WBC NRBC COR # BLD AUTO: 10.25 10*3/MM3 (ref 3.4–10.8)

## 2025-03-31 PROCEDURE — 87636 SARSCOV2 & INF A&B AMP PRB: CPT | Performed by: EMERGENCY MEDICINE

## 2025-03-31 PROCEDURE — 80053 COMPREHEN METABOLIC PANEL: CPT | Performed by: EMERGENCY MEDICINE

## 2025-03-31 PROCEDURE — 85025 COMPLETE CBC W/AUTO DIFF WBC: CPT | Performed by: EMERGENCY MEDICINE

## 2025-03-31 PROCEDURE — 25810000003 SODIUM CHLORIDE 0.9 % SOLUTION: Performed by: EMERGENCY MEDICINE

## 2025-03-31 PROCEDURE — 99284 EMERGENCY DEPT VISIT MOD MDM: CPT | Performed by: EMERGENCY MEDICINE

## 2025-03-31 PROCEDURE — 87507 IADNA-DNA/RNA PROBE TQ 12-25: CPT | Performed by: EMERGENCY MEDICINE

## 2025-03-31 PROCEDURE — 96360 HYDRATION IV INFUSION INIT: CPT

## 2025-03-31 PROCEDURE — 99283 EMERGENCY DEPT VISIT LOW MDM: CPT

## 2025-03-31 PROCEDURE — 63710000001 PROMETHAZINE PER 25 MG: Performed by: EMERGENCY MEDICINE

## 2025-03-31 RX ORDER — POTASSIUM CHLORIDE 1500 MG/1
40 TABLET, EXTENDED RELEASE ORAL ONCE
Status: COMPLETED | OUTPATIENT
Start: 2025-03-31 | End: 2025-03-31

## 2025-03-31 RX ORDER — PROMETHAZINE HYDROCHLORIDE 12.5 MG/1
12.5 TABLET ORAL EVERY 6 HOURS PRN
Qty: 12 TABLET | Refills: 0 | Status: SHIPPED | OUTPATIENT
Start: 2025-03-31

## 2025-03-31 RX ORDER — PROMETHAZINE HYDROCHLORIDE 25 MG/1
25 TABLET ORAL ONCE
Status: COMPLETED | OUTPATIENT
Start: 2025-03-31 | End: 2025-03-31

## 2025-03-31 RX ORDER — ONDANSETRON 2 MG/ML
4 INJECTION INTRAMUSCULAR; INTRAVENOUS ONCE
Status: DISCONTINUED | OUTPATIENT
Start: 2025-03-31 | End: 2025-03-31

## 2025-03-31 RX ADMIN — PROMETHAZINE HYDROCHLORIDE 25 MG: 25 TABLET ORAL at 20:13

## 2025-03-31 RX ADMIN — POTASSIUM CHLORIDE 40 MEQ: 1500 TABLET, EXTENDED RELEASE ORAL at 20:44

## 2025-03-31 RX ADMIN — SODIUM CHLORIDE 1000 ML: 9 INJECTION, SOLUTION INTRAVENOUS at 20:12

## 2025-03-31 NOTE — Clinical Note
[facility] FSED [place] [address] [place] [address] [place] [address]  Phone: [phone]    Sj Lester was seen and treated in our emergency department on 3/31/2025.  He may return to work on 04/03/2025.         Thank you for choosing [facility].    Carrie Rivas MD

## 2025-03-31 NOTE — FSED PROVIDER NOTE
Subjective   History of Present Illness  24 yom complains of nausea, vomiting and diarrhea for the past 5 days. Pt states he thinks he got the stomach flu from his Dad. The patient describes non bloody diarrhea. He last had an episode of diarrhea yesterday. He complains of body aches and feeling tired.       Review of Systems   Constitutional:  Positive for fatigue and fever.   Gastrointestinal:  Positive for diarrhea, nausea and vomiting.   Musculoskeletal:  Positive for myalgias.   All other systems reviewed and are negative.      Past Medical History:   Diagnosis Date    Allergic rhinitis     Chronic sinusitis     Obesity     MEGAN on CPAP     AHI 35/h    S/P correction of deviated nasal septum        Allergies   Allergen Reactions    Ondansetron Headache       Past Surgical History:   Procedure Laterality Date    NASAL SEPTUM SURGERY      SINUS SURGERY         Family History   Problem Relation Age of Onset    Sleep apnea Father        Social History     Socioeconomic History    Marital status: Single   Tobacco Use    Smoking status: Former    Smokeless tobacco: Never   Vaping Use    Vaping status: Never Used   Substance and Sexual Activity    Alcohol use: Yes    Drug use: No    Sexual activity: Defer           Objective   Physical Exam  Vitals reviewed.   Constitutional:       General: He is not in acute distress.     Appearance: Normal appearance. He is obese. He is not ill-appearing or toxic-appearing.   HENT:      Head: Normocephalic and atraumatic.      Nose: Nose normal.      Mouth/Throat:      Mouth: Mucous membranes are moist.      Pharynx: Oropharynx is clear.   Eyes:      Extraocular Movements: Extraocular movements intact.      Pupils: Pupils are equal, round, and reactive to light.   Cardiovascular:      Rate and Rhythm: Normal rate and regular rhythm.      Pulses: Normal pulses.      Heart sounds: Normal heart sounds.   Pulmonary:      Effort: Pulmonary effort is normal.      Breath sounds: Normal  breath sounds.   Abdominal:      Palpations: Abdomen is soft.      Tenderness: There is no abdominal tenderness.   Musculoskeletal:         General: Normal range of motion.      Cervical back: Normal range of motion and neck supple.   Skin:     General: Skin is warm and dry.      Capillary Refill: Capillary refill takes less than 2 seconds.   Neurological:      General: No focal deficit present.      Mental Status: He is alert.         Procedures           ED Course  ED Course as of 04/01/25 0131   Mon Mar 31, 2025   2102 Pt able to tolerate oral fluids in the ER. Discussed test results and treatment plan.  [BM]      ED Course User Index  [BM] Carrie Rivas MD                                           Medical Decision Making  This patient presents with nausea, vomiting & diarrhea. Differential diagnosis includes possible acute gastroenteritis. Abdominal exam without peritoneal signs. Currently euvolemic without evidence of dehydration. Doubt invasive bacteria causing diarrhea such as C diff (no recent antibiotics), shiga toxin (non bloody). No recent travel. Patient is not immunocompromised. Diarrhea is non bloody so less likely inflammatory bowel disease. No evidence of surgical abdomen or other acute medical emergency including bowel obstruction, viscus perforation, vascular catastrophe, atypical appendicitis, acute cholecystitis, UGIB, thyrotoxicosis, or diverticulitis at this time. Presentation not consistent with other acute, emergent causes of vomiting / diarrhea at this time. No indication for abdominal imaging.    Problems Addressed:  Vomiting and diarrhea: complicated acute illness or injury    Amount and/or Complexity of Data Reviewed  Labs: ordered.    Risk  Prescription drug management.        Final diagnoses:   Vomiting and diarrhea       ED Disposition  ED Disposition       ED Disposition   Discharge    Condition   Stable    Comment   --               Le Keenan, APRN  [address]  [place] [address] [phone]    Schedule an appointment as soon as possible for a visit on 4/2/2025           Medication List        New Prescriptions      promethazine 12.5 MG tablet  Commonly known as: PHENERGAN  Take 1 tablet by mouth Every 6 (Six) Hours As Needed for Nausea.               Where to Get Your Medications        These medications were sent to [facility]pharm[facility] #[id number] - [place], [place] - [address] [facility] - [phone]  - [phone] [address] [place] [address]      Hours: 24-hours Phone: [phone]   promethazine 12.5 MG tablet

## 2025-04-01 LAB
ADV 40+41 DNA STL QL NAA+NON-PROBE: NOT DETECTED
ASTRO TYP 1-8 RNA STL QL NAA+NON-PROBE: NOT DETECTED
C CAYETANENSIS DNA STL QL NAA+NON-PROBE: NOT DETECTED
C COLI+JEJ+UPSA DNA STL QL NAA+NON-PROBE: NOT DETECTED
CRYPTOSP DNA STL QL NAA+NON-PROBE: NOT DETECTED
E HISTOLYT DNA STL QL NAA+NON-PROBE: NOT DETECTED
EAEC PAA PLAS AGGR+AATA ST NAA+NON-PRB: NOT DETECTED
EC STX1+STX2 GENES STL QL NAA+NON-PROBE: NOT DETECTED
EPEC EAE GENE STL QL NAA+NON-PROBE: NOT DETECTED
ETEC LTA+ST1A+ST1B TOX ST NAA+NON-PROBE: NOT DETECTED
G LAMBLIA DNA STL QL NAA+NON-PROBE: NOT DETECTED
NOROVIRUS GI+II RNA STL QL NAA+NON-PROBE: DETECTED
P SHIGELLOIDES DNA STL QL NAA+NON-PROBE: NOT DETECTED
RVA RNA STL QL NAA+NON-PROBE: NOT DETECTED
S ENT+BONG DNA STL QL NAA+NON-PROBE: NOT DETECTED
SAPO I+II+IV+V RNA STL QL NAA+NON-PROBE: NOT DETECTED
SHIGELLA SP+EIEC IPAH ST NAA+NON-PROBE: NOT DETECTED
V CHOL+PARA+VUL DNA STL QL NAA+NON-PROBE: NOT DETECTED
V CHOLERAE DNA STL QL NAA+NON-PROBE: NOT DETECTED
Y ENTEROCOL DNA STL QL NAA+NON-PROBE: NOT DETECTED

## 2025-04-01 NOTE — DISCHARGE INSTRUCTIONS
Clear liquid diet for the next 24 hours. Advance as tolerated. Rotate Tylenol and Motrin for pain. Take medication as prescribed. Follow-up with your Doctor for further evaluation. Return if problems.